# Patient Record
Sex: FEMALE | Race: WHITE | NOT HISPANIC OR LATINO | Employment: FULL TIME | URBAN - METROPOLITAN AREA
[De-identification: names, ages, dates, MRNs, and addresses within clinical notes are randomized per-mention and may not be internally consistent; named-entity substitution may affect disease eponyms.]

---

## 2017-06-15 ENCOUNTER — HOSPITAL ENCOUNTER (OUTPATIENT)
Facility: MEDICAL CENTER | Age: 37
End: 2017-06-15
Attending: PREVENTIVE MEDICINE
Payer: COMMERCIAL

## 2017-06-15 ENCOUNTER — OCCUPATIONAL MEDICINE (OUTPATIENT)
Dept: OCCUPATIONAL MEDICINE | Facility: CLINIC | Age: 37
End: 2017-06-15
Payer: COMMERCIAL

## 2017-06-15 VITALS
OXYGEN SATURATION: 97 % | DIASTOLIC BLOOD PRESSURE: 72 MMHG | SYSTOLIC BLOOD PRESSURE: 116 MMHG | RESPIRATION RATE: 12 BRPM | HEART RATE: 68 BPM | BODY MASS INDEX: 20.09 KG/M2 | WEIGHT: 128 LBS | TEMPERATURE: 98.6 F | HEIGHT: 67 IN
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
## 2017-06-15 LAB
ALBUMIN SERPL BCP-MCNC: 4.6 G/DL (ref 3.2–4.9)
ALBUMIN/GLOB SERPL: 1.5 G/DL
ALP SERPL-CCNC: 49 U/L (ref 30–99)
ALT SERPL-CCNC: 11 U/L (ref 2–50)
ANION GAP SERPL CALC-SCNC: 8 MMOL/L (ref 0–11.9)
AST SERPL-CCNC: 16 U/L (ref 12–45)
BILIRUB SERPL-MCNC: 0.5 MG/DL (ref 0.1–1.5)
BREATH ALCOHOL COMMENT: NORMAL
BUN SERPL-MCNC: 18 MG/DL (ref 8–22)
CALCIUM SERPL-MCNC: 9.6 MG/DL (ref 8.5–10.5)
CHLORIDE SERPL-SCNC: 106 MMOL/L (ref 96–112)
CO2 SERPL-SCNC: 25 MMOL/L (ref 20–33)
CREAT SERPL-MCNC: 0.71 MG/DL (ref 0.5–1.4)
ERYTHROCYTE [DISTWIDTH] IN BLOOD BY AUTOMATED COUNT: 44.5 FL (ref 35.9–50)
GFR SERPL CREATININE-BSD FRML MDRD: >60 ML/MIN/1.73 M 2
GLOBULIN SER CALC-MCNC: 3.1 G/DL (ref 1.9–3.5)
GLUCOSE SERPL-MCNC: 89 MG/DL (ref 65–99)
HBV CORE AB SERPL QL IA: NEGATIVE
HBV SURFACE AB SERPL IA-ACNC: >1000 MIU/ML (ref 0–10)
HBV SURFACE AG SER QL: NEGATIVE
HCT VFR BLD AUTO: 43.4 % (ref 37–47)
HCV AB SER QL: NEGATIVE
HGB BLD-MCNC: 14.2 G/DL (ref 12–16)
HIV 1+2 AB+HIV1 P24 AG SERPL QL IA: NON REACTIVE
MCH RBC QN AUTO: 30.7 PG (ref 27–33)
MCHC RBC AUTO-ENTMCNC: 32.7 G/DL (ref 33.6–35)
MCV RBC AUTO: 93.9 FL (ref 81.4–97.8)
PLATELET # BLD AUTO: 314 K/UL (ref 164–446)
PMV BLD AUTO: 9.9 FL (ref 9–12.9)
POC BREATHALIZER: 0 PERCENT (ref 0–0.01)
POTASSIUM SERPL-SCNC: 3.8 MMOL/L (ref 3.6–5.5)
PROT SERPL-MCNC: 7.7 G/DL (ref 6–8.2)
RBC # BLD AUTO: 4.62 M/UL (ref 4.2–5.4)
SODIUM SERPL-SCNC: 139 MMOL/L (ref 135–145)
WBC # BLD AUTO: 7.1 K/UL (ref 4.8–10.8)

## 2017-06-15 PROCEDURE — 82075 ASSAY OF BREATH ETHANOL: CPT | Performed by: PREVENTIVE MEDICINE

## 2017-06-15 PROCEDURE — 87340 HEPATITIS B SURFACE AG IA: CPT

## 2017-06-15 PROCEDURE — 86706 HEP B SURFACE ANTIBODY: CPT

## 2017-06-15 PROCEDURE — 87389 HIV-1 AG W/HIV-1&-2 AB AG IA: CPT

## 2017-06-15 PROCEDURE — 86803 HEPATITIS C AB TEST: CPT

## 2017-06-15 PROCEDURE — 99203 OFFICE O/P NEW LOW 30 MIN: CPT | Performed by: PREVENTIVE MEDICINE

## 2017-06-15 PROCEDURE — 86704 HEP B CORE ANTIBODY TOTAL: CPT

## 2017-06-15 PROCEDURE — 80053 COMPREHEN METABOLIC PANEL: CPT

## 2017-06-15 PROCEDURE — 85027 COMPLETE CBC AUTOMATED: CPT

## 2017-06-15 PROCEDURE — 8898 PR URINE 11 PANEL - SEND TO LAB: Performed by: PREVENTIVE MEDICINE

## 2017-06-15 NOTE — MR AVS SNAPSHOT
"Nellie Duarte   6/15/2017 11:20 AM   Occupational Medicine   MRN: [id number]    Department:  [facility]   Dept Phone:  [phone]    Description:  Female : 1980   Provider:  Guru Rosado D.O.           Reason for Visit     Follow-Up WC DOI 06/15/17- Needlestick       Allergies as of 6/15/2017     Allergen Noted Reactions    Nkda [No Known Drug Allergy] 10/22/2010         You were diagnosed with     Needlestick injury accident with exposure to body fluid   [[id number]]         Vital Signs     Blood Pressure Pulse Temperature Respirations Height Weight    116/72 mmHg 68 37 °C (98.6 °F) 12 1.702 m (5' 7\") 58.06 kg (128 lb)    Body Mass Index Oxygen Saturation Smoking Status             20.04 kg/m2 97% Never Smoker          Basic Information     Date Of Birth Sex Race Ethnicity Preferred Language    1980 Female White Non- English      Your appointments     2017 11:00 AM   Workers Compensation with Guru Rosado D.O.   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   Unknown - Present    Preventative health care Z00.00   2014 - Present    Asthma, mild intermittent J45.20   3/2/2015 - Present    Dysuria R30.0   3/2/2015 - Present      Health Maintenance        Date Due Completion Dates    IMM PNEUMOCOCCAL 19-64 (ADULT) MEDIUM RISK SERIES (1 of 1 - PPSV23) 2000 ---    PAP SMEAR 2017 (Done)    Override on 2014: Done    IMM DTaP/Tdap/Td Vaccine (2 - Td) 2024            Results     POCT Breath Alcohol Test      Component Value Standard Range & Units    POC Breathalizer 0.000 0.00 - 0.01 Percent    Breath Alcohol Comment                          Current Immunizations     Influenza TIV (IM) 10/2/2014, 2013, 3/13/2013    Tdap " Vaccine 4/21/2014    Tuberculin Skin Test 12/20/2013  4:57 PM, 8/16/2013, 8/17/2012  1:35 PM, 8/19/2011      Below and/or attached are the medications your provider expects you to take. Review all of your home medications and newly ordered medications with your provider and/or pharmacist. Follow medication instructions as directed by your provider and/or pharmacist. Please keep your medication list with you and share with your provider. Update the information when medications are discontinued, doses are changed, or new medications (including over-the-counter products) are added; and carry medication information at all times in the event of emergency situations     Allergies:  NKDA - (reactions not documented)               Medications  Valid as of: Mariella 15, 2017 - 12:58 PM    Generic Name Brand Name Tablet Size Instructions for use    Beclomethasone Dipropionate (Aero Soln) QVAR 40 MCG/ACT INHALE 1 PUFF BY MOUTH TWICE DAILY        Levalbuterol Tartrate (Aerosol) XOPENEX HFA 45 MCG/ACT Inhale 1-2 Puffs by mouth every 8 hours as needed for Shortness of Breath.        Norgestim-Eth Estrad Triphasic   Take  by mouth.        .                 Medicines prescribed today were sent to:     [facility] [address] [place] [address] - [address] [facility]    [address] [address]    Phone: [phone] Fax: [phone]    Open 24 Hours?: Yes    [id number] - [place], [place] - [address]    [address] [facility] [address]    Phone: [phone] Fax: [phone]    Open 24 Hours?: No      Medication refill instructions:       If your prescription bottle indicates you have medication refills left, it is not necessary to call your provider’s office. Please contact your pharmacy and they will refill your medication.    If your prescription bottle indicates you do not have any refills left, you may request refills at any time through one of the following ways: The online  [employer] system (except Urgent Care), by calling your provider’s office, or by asking your pharmacy to contact your provider’s office with a refill request. Medication refills are processed only during regular business hours and may not be available until the next business day. Your provider may request additional information or to have a follow-up visit with you prior to refilling your medication.   *Please Note: Medication refills are assigned a new Rx number when refilled electronically. Your pharmacy may indicate that no refills were authorized even though a new prescription for the same medication is available at the pharmacy. Please request the medicine by name with the pharmacy before contacting your provider for a refill.        Your To Do List     Future Labs/Procedures Complete By Expires    EXPOSED PERSON-SOURCE PT POSITIVE OR UNK (BLOOD & BODY FLUID EXPOSURE)  As directed 6/15/2018         [employer]ate Access Code: Activation code not generated  Current Sk[employer]Slate Status: Active

## 2017-06-15 NOTE — Clinical Note
[facility]   [address] [place] [address],   [address] [place] [address]  Phone: [phone] - Fax: [phone]        Occupational Health [facility] Progress Report and Disability Certification  Date of Service: 6/15/2017   No Show:  No  Date / Time of Next Visit: 6/16/2017@10:00am okay to double book   Claim Information   Patient Name: Nellie Duarte  Claim Number:     Employer: [employer]  Date of Injury: 6/15/2017     Insurer / TPA: [employer]  ID / SSN:     Occupation: RN  Diagnosis: The encounter diagnosis was Needlestick injury accident with exposure to body fluid.    Medical Information   Related to Industrial Injury? Yes    Subjective Complaints:  DOI 6/15/2017: After giving Lovenox injection injured worker accidentally stuck herself in the left hand with the needle. Injured worker vigorously washed wound with soap and water. Noted only minimal bleeding and sharp pain. Source status unknown, but blood work drawn. Source denied any history of HIV, or hepatitis C Patient currently asymptomatic.   Objective Findings: Constitutional: She appears well-developed and well-nourished.   HENT: Normocephalic and atraumatic. EOM are normal. No scleral icterus.   Cardiovascular: Normal rate, regular rhythm and normal heart sounds.    Pulmonary/Chest: Effort normal and breath sounds normal. No respiratory distress.   Abdominal: Bowel sounds are normal. There is no tenderness.   Neurological: She is alert and oriented to person, place, and time.   Skin: Skin is warm and dry.   Psychiatric: She has a normal mood and affect. Her behavior is normal.      Pre-Existing Condition(s):     Assessment:   Initial Visit    Status: Additional Care Required  Permanent Disability:No    Plan:      Diagnostics:      Comments:  Baseline labs drawn  Awaiting labs from source patient, patient to call with source labs turn positive  Discussed risks and benefits of HIV prophylaxis, given unknown  source HIV prophylaxis is not recommended, patient amenable to this  Follow-up anand soares    Disability Information   Status: Released to Full Duty    From:  6/15/2017  Through: 6/16/2017 Restrictions are:     Physical Restrictions   Sitting:    Standing:    Stooping:    Bending:      Squatting:    Walking:    Climbing:    Pushing:      Pulling:    Other:    Reaching Above Shoulder (L):   Reaching Above Shoulder (R):       Reaching Below Shoulder (L):    Reaching Below Shoulder (R):      Not to exceed Weight Limits   Carrying(hrs):   Weight Limit(lb):   Lifting(hrs):   Weight  Limit(lb):     Comments:      Repetitive Actions   Hands: i.e. Fine Manipulations from Grasping:     Feet: i.e. Operating Foot Controls:     Driving / Operate Machinery:     Physician Name: Guru Rosado D.O. Physician Signature: ScottyTAGURU GORDON D.O. e-Signature: Dr. Baldo Villarreal, Medical Director   Clinic Name / Location: [address],   [address] [place] [address] Clinic Phone Number: Dept: [phone]   Appointment Time: 11:20 Am Visit Start Time: 12:17 PM   Check-In Time:  11:59 Am Visit Discharge Time:  @1:00pm    Original-Treating Physician or Chiropractor    Page 2-Insurer/TPA    Page 3-Employer    Page 4-Employee

## 2017-06-15 NOTE — PROGRESS NOTES
Subjective:      Nelile Duarte is a 36 y.o. female who presents with No chief complaint on file.      DOI 6/15/2017: After giving Lovenox injection injured worker accidentally stuck herself in the left hand with the needle. Injured worker vigorously washed wound with soap and water. Noted only minimal bleeding and sharp pain. Source status unknown, but blood work drawn. Source denied any history of HIV, or hepatitis C Patient currently asymptomatic.     HPI    ROS  ROS: All systems were reviewed on intake form, form was reviewed and signed. See scanned documents in media. Pertinent positives and negatives included in HPI.    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     Physical Exam    Constitutional: She appears well-developed and well-nourished.   HENT: Normocephalic and atraumatic. EOM are normal. No scleral icterus.   Cardiovascular: Normal rate, regular rhythm and normal heart sounds.    Pulmonary/Chest: Effort normal and breath sounds normal. No respiratory distress.   Abdominal: Bowel sounds are normal. There is no tenderness.   Neurological: She is alert and oriented to person, place, and time.   Skin: Skin is warm and dry.   Psychiatric: She has a normal mood and affect. Her behavior is normal.          Assessment/Plan:     1. Needlestick injury accident with exposure to body fluid  - EXPOSED PERSON-SOURCE PT POSITIVE OR UNK (BLOOD & BODY FLUID EXPOSURE); Future  Baseline labs drawn  Awaiting labs from source patient, patient to call with source labs turn positive  Discussed risks and benefits of HIV prophylaxis, given unknown source HIV prophylaxis is not recommended, patient amenable to this  Follow-up tomorrow

## 2017-06-15 NOTE — Clinical Note
EMPLOYEE’S CLAIM FOR COMPENSATION/ REPORT OF INITIAL TREATMENT  FORM C-4    EMPLOYEE’S CLAIM - PROVIDE ALL INFORMATION REQUESTED   First Name  Nellie Last Name  Gerald Birthdate                    1980                Sex  female Claim Number   Home Address  [address] Age  36 y.o. Height   Weight   SSN     [facility] Zip  [address] Telephone  [phone] (home)    Mailing Address  [address] [facility] Zip  [address] Primary Language Spoken  English    Insurer   Third Party   [employer]   Employee's Occupation (Job Title) When Injury or Occupational Disease Occurred  RN    Employer's Name  [employer]  Telephone  [phone]    Employer Address  [address] \ [facility] [place] [facility]  Zip   [address]   Date of Injury  6/15/2017               Hour of Injury  9:40 AM Date Employer Notified  6/15/2017 Last Day of Work after Injury or Occupational Disease  6/15/2017 Supervisor to Whom Injury Reported  Kristy N   Address or Location of Accident (if applicable)  [facility]   What were you doing at the time of accident? (if applicable)  Giving medication    How did this injury or occupational disease occur? (Be specific an answer in detail. Use additional sheet if necessary)  Injecting patient with lovenox syringe. Upon injection into patient's skin, my right arm knocked patients off tray, I accidentally jerked my arm as reaction, needle came out and injected into my left hand   If you believe that you have an occupational disease, when did you first have knowledge of the disability and it relationship to your employment?  N/A Witnesses to the Accident  N/A      Nature of Injury or Occupational Disease  Puncture  Part(s) of Body Injured or Affected  Hand (L), N/A, N/A    I certify that the above is true and correct to the best of my knowledge and that I have  provided this information in order to obtain the benefits of [place]  I hereby authorize any physician, chiropractor, surgeon, practitioner, or other person, any hospital, including [facility] hospital, any medical service organization, any insurance company, or other institution or organization to release to each other, any medical or other information, including benefits paid or payable, pertinent to this injury or disease, except information relative to diagnosis, treatment and/or counseling for AIDS, psychological conditions, alcohol or controlled substances, for which I must give specific authorization.  A Photostat of this authorization shall be as valid as the original.     Date   Place   Employee’s Signature   THIS REPORT MUST BE COMPLETED AND MAILED WITHIN 3 WORKING DAYS OF TREATMENT   Place  [facility]  Name of [facility]   Date  6/15/2017 Diagnosis  (Z77.21,  W27.3XXA) Needlestick injury accident with exposure to body fluid Is there evidence the injured employee was under the influence of alcohol and/or another controlled substance at the time of accident?   Hour  12:17 PM Description of Injury or Disease  The encounter diagnosis was Needlestick injury accident with exposure to body fluid. No   Treatment  None  Have you advised the patient to remain off work five days or more? No   X-Ray Findings      If Yes   From Date  To Date      From information given by the employee, together with medical evidence, can you directly connect this injury or occupational disease as job incurred?  Yes If No Full Duty  Yes Modified Duty      Is additional medical care by a physician indicated?  Yes If Modified Duty, Specify any Limitations / Restrictions      Do you know of any previous injury or disease contributing to this condition or occupational disease?    "                         No   Date  6/15/2017 Print Doctor’s Name Guru Rosado D.O. I certify the employer’s copy of  this form was mailed on:   Address  [address],   [address] Insurer’s Use Only     [facility] Zip  [address]    Provider’s Tax ID Number  [id number]  Telephone  Dept: [phone]        e-SignTAYLORGURU D.O.   e-Signature: Dr. Baldo Villarreal, Medical Director Degree  DO        ORIGINAL-TREATING PHYSICIAN OR CHIROPRACTOR    PAGE 2-INSURER/TPA    PAGE 3-EMPLOYER    PAGE 4-EMPLOYEE             Form C-4 (rev10/07)              BRIEF DESCRIPTION OF RIGHTS AND BENEFITS  (Pursuant to [place] 616C.[place])    Notice of Injury or Occupational Disease (Incident Report Form C-1): If an injury or occupational disease (OD) arises out of and in the  course of employment, you must provide written notice to your employer as soon as practicable, but no later than 7 days after the accident or  OD. Your employer shall maintain a sufficient supply of the required forms.    Claim for Compensation (Form C-4): If medical treatment is sought, the form C-4 is available at the place of initial treatment. A completed  \"Claim for Compensation\" (Form C-4) must be filed within 90 days after an accident or OD. The treating physician or chiropractor must,  within 3 working days after treatment, complete and mail to the employer, the employer's insurer and third-party , the Claim for  Compensation.    Medical Treatment: If you require medical treatment for your on-the-job injury or OD, you may be required to select a physician or  chiropractor from a list provided by your workers’ compensation insurer, if it has contracted with an Organization for Managed Care (MCO) or  Preferred Provider Organization (PPO) or providers of health care. If your employer has not entered into a contract with an MCO or PPO, you  may select a physician or chiropractor from the Panel of Physicians and Chiropractors. Any medical " costs related to your industrial injury or  OD will be paid by your insurer.    Temporary Total Disability (TTD): If your doctor has certified that you are unable to work for a period of at least 5 consecutive days, or 5  cumulative days in a 20-day period, or places restrictions on you that your employer does not accommodate, you may be entitled to TTD  compensation.    Temporary Partial Disability (TPD): If the wage you receive upon reemployment is less than the compensation for TTD to which you are  entitled, the insurer may be required to pay you TPD compensation to make up the difference. TPD can only be paid for a maximum of 24  months.    Permanent Partial Disability (PPD): When your medical condition is stable and there is an indication of a PPD as a result of your injury or  OD, within 30 days, your insurer must arrange for an evaluation by a rating physician or chiropractor to determine the degree of your PPD. The  amount of your PPD award depends on the date of injury, the results of the PPD evaluation and your age and wage.    Permanent Total Disability (PTD): If you are medically certified by a treating physician or chiropractor as permanently and totally disabled  and have been granted a PTD status by your insurer, you are entitled to receive monthly benefits not to exceed 66 2/3% of your average  monthly wage. The amount of your PTD payments is subject to reduction if you previously received a PPD award.    Vocational Rehabilitation Services: You may be eligible for vocational rehabilitation services if you are unable to return to the job due to a  permanent physical impairment or permanent restrictions as a result of your injury or occupational disease.    Transportation and Per Evgeny Reimbursement: You may be eligible for travel expenses and per evgeny associated with medical treatment.    Reopening: You may be able to reopen your claim if your condition worsens after claim closure.    Appeal Process:  If you disagree with a written determination issued by the insurer or the insurer does not respond to your request, you may  appeal to the Department of Administration, , by following the instructions contained in your determination letter. You must  appeal the determination within 70 days from the date of the determination letter at [address], [address], [place], [place]  [address], or [address], [address], [place], [place] [address]. If you disagree with the  decision, you may appeal to the  Department of Administration, . You must file your appeal within 30 days from the date of the  decision  letter at [address], [address], [place], [place] [address], or [address] [facility] [address], [place], [place] [address]. If you  disagree with a decision of an , you may file a petition for judicial review with the District Court. You must do so within 30  days of the Appeal Officer’s decision. You may be represented by an  at your own expense or you may contact the [facility] for possible  representation.    [place]  for Injured Workers (NAIW): If you disagree with a  decision, you may request that NAIW represent you  without charge at an  Hearing. For information regarding denial of benefits, you may contact the [facility] at: [address], [address], [place] [address], [phone], or [address], [address], [place] [address], [phone]    To File a Complaint with the Division: If you wish to file a complaint with the  of the Division of Industrial Relations (DIR),  please contact the Workers’ Compensation Section, [address], [address], [place], [place] [address], telephone [phone]) [phone], or  [address], [place] [address], telephone [phone] [phone].    For assistance with Workers’  Compensation Issues: you may contact the Office of the Governor Consumer Health Assistance, [facility] [facility] [address], [place] [address], Toll Free 1-997.290.4744, Web site: http://govcha.[facility].nv., E-mail  [email or web address].[email or web address].                                                                                                                                                                                                                                   __________________________________________________________________                                                                   _________________                Employee Name / Signature                                                                                                                                                       Date                                                                                                                                                                                                     D-2 (rev. 10/07)

## 2017-06-20 ENCOUNTER — OCCUPATIONAL MEDICINE (OUTPATIENT)
Dept: OCCUPATIONAL MEDICINE | Facility: CLINIC | Age: 37
End: 2017-06-20
Payer: COMMERCIAL

## 2017-06-20 VITALS
TEMPERATURE: 98.2 F | SYSTOLIC BLOOD PRESSURE: 120 MMHG | HEART RATE: 81 BPM | BODY MASS INDEX: 20.4 KG/M2 | HEIGHT: 67 IN | DIASTOLIC BLOOD PRESSURE: 90 MMHG | OXYGEN SATURATION: 96 % | WEIGHT: 130 LBS
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
## 2017-06-20 PROCEDURE — 99212 OFFICE O/P EST SF 10 MIN: CPT | Performed by: PREVENTIVE MEDICINE

## 2017-06-20 ASSESSMENT — PAIN SCALES - GENERAL: PAINLEVEL: NO PAIN

## 2017-06-20 NOTE — PROGRESS NOTES
"Subjective:      Nellie Duarte is a 37 y.o. female who presents with Follow-Up      DOI 6/15/2017: After giving Lovenox injection injured worker accidentally stuck herself in the left hand with the needle. Injured worker vigorously washed wound with soap and water. Noted only minimal bleeding and sharp pain. Patient asymptomatic. Source negative.     HPI    ROS       Objective:     /90 mmHg  Pulse 81  Temp(Src) 36.8 °C (98.2 °F)  Ht 1.702 m (5' 7\")  Wt 58.968 kg (130 lb)  BMI 20.36 kg/m2  SpO2 96%     Physical Exam    Constitutional: She appears well-developed and well-nourished.      Cardiovascular: Normal rate.   Pulmonary/Chest: Effort normal. No respiratory distress.    Neurological: She is alert and oriented to person, place, and time.    Skin: Skin is warm and dry.    Psychiatric: She has a normal mood and affect. Her behavior is normal       Assessment/Plan:     1. Needlestick injury accident with exposure to body fluid  Baseline labs were normal. Immune to Hep B. Negative for Hep C, Hep B and HIV  Given negative source no further follow-up or monitoring recommended patient amenable to this  Full duty  Release from care          "

## 2017-06-20 NOTE — MR AVS SNAPSHOT
"Nellie Duarte   2017 3:30 PM   Occupational Medicine   MRN: [id number]    Department:  [facility]   Dept Phone:  [phone]    Description:  Female : 1980   Provider:  Guru Rosado D.O.           Reason for Visit     Follow-Up WC DOI 06/15/17 [place] ROOM 1      Allergies as of 2017     Allergen Noted Reactions    Nkda [No Known Drug Allergy] 10/22/2010         You were diagnosed with     Needlestick injury accident with exposure to body fluid   [[id number]]         Vital Signs     Blood Pressure Pulse Temperature Height Weight Body Mass Index    120/90 mmHg 81 36.8 °C (98.2 °F) 1.702 m (5' 7\") 58.968 kg (130 lb) 20.36 kg/m2    Oxygen Saturation Smoking Status                96% Never Smoker           Basic Information     Date Of Birth Sex Race Ethnicity Preferred Language    1980 Female White Non- English      Problem List              ICD-10-CM Priority Class Noted - Resolved    Allergic rhinitis    2009 - Present    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   Unknown - Present    Preventative health care Z00.00   2014 - Present    Asthma, mild intermittent J45.20   3/2/2015 - Present    Dysuria R30.0   3/2/2015 - Present      Health Maintenance        Date Due Completion Dates    IMM PNEUMOCOCCAL 19-64 (ADULT) MEDIUM RISK SERIES (1 of 1 - PPSV23) 2000 ---    PAP SMEAR 2017 (Done)    Override on 2014: Done    IMM DTaP/Tdap/Td Vaccine (2 - Td) 2024            Current Immunizations     Influenza TIV (IM) 10/2/2014, 2013, 3/13/2013    Tdap Vaccine 2014    Tuberculin Skin Test 2013  4:57 PM, 2013, 2012  1:35 PM, 2011      Below and/or attached are the medications your provider expects you to take. Review all of your home medications and newly ordered medications with your provider and/or pharmacist. Follow medication instructions as directed by your provider and/or pharmacist. Please keep your medication " list with you and share with your provider. Update the information when medications are discontinued, doses are changed, or new medications (including over-the-counter products) are added; and carry medication information at all times in the event of emergency situations     Allergies:  NKDA - (reactions not documented)               Medications  Valid as of: June 20, 2017 -  4:08 PM    Generic Name Brand Name Tablet Size Instructions for use    Beclomethasone Dipropionate (Aero Soln) QVAR 40 MCG/ACT INHALE 1 PUFF BY MOUTH TWICE DAILY        Levalbuterol Tartrate (Aerosol) XOPENEX HFA 45 MCG/ACT Inhale 1-2 Puffs by mouth every 8 hours as needed for Shortness of Breath.        Norgestim-Eth Estrad Triphasic   Take  by mouth.        .                 Medicines prescribed today were sent to:     [facility] [id number] [place] [address] - [address] [facility] [address] [place]    [address] [address]    Phone: [phone] Fax: [phone]    Open 24 Hours?: Yes    [facility] #[id number] - [place], [place] - [address]    [address] [facility] [address]    Phone: [phone] Fax: [phone]    Open 24 Hours?: No      Medication refill instructions:       If your prescription bottle indicates you have medication refills left, it is not necessary to call your provider’s office. Please contact your pharmacy and they will refill your medication.    If your prescription bottle indicates you do not have any refills left, you may request refills at any time through one of the following ways: The online [employer] system (except Urgent Care), by calling your provider’s office, or by asking your pharmacy to contact your provider’s office with a refill request. Medication refills are processed only during regular business hours and may not be available until the next business day. Your provider may request additional information or to have a follow-up visit with you prior to refilling your  medication.   *Please Note: Medication refills are assigned a new Rx number when refilled electronically. Your pharmacy may indicate that no refills were authorized even though a new prescription for the same medication is available at the pharmacy. Please request the medicine by name with the pharmacy before contacting your provider for a refill.           [employer] Access Code: Activation code not generated  Current [employer] Status: Active

## 2017-06-20 NOTE — Clinical Note
[address],   [address] [place] [address]  Phone: [phone] - Fax: [phone]        [facility] Progress Report and Disability Certification  Date of Service: 6/20/2017   No Show:  No  Date / Time of Next Visit:  Release from care   Claim Information   Patient Name: Nellie Duarte  Claim Number:     Employer: [employer]  Date of Injury: 6/15/2017     Insurer / TPA: [employer]  ID / SSN:     Occupation: RN Diagnosis: The encounter diagnosis was Needlestick injury accident with exposure to body fluid.    Medical Information   Related to Industrial Injury? 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 Injured worker vigorously washed wound with soap and water. Noted only minimal bleeding and sharp pain. Patient asymptomatic. Source negative.   Objective Findings: Constitutional: She appears well-developed and well-nourished.      Cardiovascular: Normal rate.   Pulmonary/Chest: Effort normal. No respiratory distress.    Neurological: She is alert and oriented to person, place, and time.    Skin: Skin is warm and dry.    Psychiatric: She has a normal mood and affect. Her behavior is normal   Pre-Existing Condition(s):     Assessment:   Condition Improved    Status: Discharged /  MMI  Permanent Disability:No    Plan:      Diagnostics:      Comments:  Baseline labs were normal. Immune to Hep B. Negative for Hep C, Hep B and HIV  Given negative source no further follow-up or monitoring recommended patient amenable to this  Full duty  Release from care    Disability Information   Status: Released to Full Duty    From:  6/20/2017  Through:   Restrictions are:     Physical Restrictions   Sitting:    Standing:    Stooping:    Bending:      Squatting:    Walking:    Climbing:    Pushing:      Pulling:    Other:    Reaching Above  Shoulder (L):   Reaching Above Shoulder (R):       Reaching Below Shoulder (L):    Reaching Below Shoulder (R):      Not to exceed Weight Limits   Carrying(hrs):   Weight Limit(lb):   Lifting(hrs):   Weight  Limit(lb):     Comments:      Repetitive Actions   Hands: i.e. Fine Manipulations from Grasping:     Feet: i.e. Operating Foot Controls:     Driving / Operate Machinery:     Physician Name: Guru Barakat D.O. Physician Signature: cheoSignGURU BARAKAT D.O. e-Signature: Dr. Baldo Villarreal, Medical Director   Clinic Name / Location: [address],   [address]  [place] [address] Clinic Phone Number: Dept: [phone]   Appointment Time: 3:30 Pm Visit Start Time: 3:32 PM   Check-In Time:  3:18 Pm Visit Discharge Time:  @3:42PM   Original-Treating Physician or Chiropractor    Page 2-Insurer/TPA    Page 3-Employer    Page 4-Employee

## 2017-08-01 ENCOUNTER — OFFICE VISIT (OUTPATIENT)
Dept: MEDICAL GROUP | Facility: PHYSICIAN GROUP | Age: 37
End: 2017-08-01
Payer: COMMERCIAL

## 2017-08-01 VITALS
BODY MASS INDEX: 21.66 KG/M2 | WEIGHT: 138 LBS | HEART RATE: 80 BPM | RESPIRATION RATE: 16 BRPM | HEIGHT: 67 IN | DIASTOLIC BLOOD PRESSURE: 72 MMHG | OXYGEN SATURATION: 98 % | SYSTOLIC BLOOD PRESSURE: 122 MMHG | TEMPERATURE: 98.8 F

## 2017-08-01 DIAGNOSIS — J45.20 MILD INTERMITTENT ASTHMA WITHOUT COMPLICATION: ICD-10-CM

## 2017-08-01 PROCEDURE — 99214 OFFICE O/P EST MOD 30 MIN: CPT | Performed by: NURSE PRACTITIONER

## 2017-08-01 RX ORDER — ACETAMINOPHEN AND CODEINE PHOSPHATE 120; 12 MG/5ML; MG/5ML
0.35 SOLUTION ORAL DAILY
COMMUNITY
Start: 2017-07-28 | End: 2018-07-28

## 2017-08-01 RX ORDER — LEVALBUTEROL TARTRATE 45 UG/1
1-2 AEROSOL, METERED ORAL EVERY 8 HOURS PRN
Qty: 1 INHALER | Refills: 3 | Status: SHIPPED | OUTPATIENT
Start: 2017-08-01

## 2017-08-01 ASSESSMENT — PAIN SCALES - GENERAL: PAINLEVEL: NO PAIN

## 2017-08-01 ASSESSMENT — PATIENT HEALTH QUESTIONNAIRE - PHQ9: CLINICAL INTERPRETATION OF PHQ2 SCORE: 0

## 2017-08-01 NOTE — MR AVS SNAPSHOT
"        Nellie Duarte   2017 4:00 PM   Office Visit   MRN: [id number]    Department:  [facility]   Dept Phone:  [phone]    Description:  Female : 1980   Provider:  RUFINA Tobin           Reason for Visit     Establish Care New Patient     Medication Refill           Allergies as of 2017     Allergen Noted Reactions    Nkda [No Known Drug Allergy] 10/22/2010         You were diagnosed with     Mild intermittent asthma without complication   [[id number]]         Vital Signs     Blood Pressure Pulse Temperature Respirations Height Weight    122/72 mmHg 80 37.1 °C (98.8 °F) 16 1.702 m (5' 7.01\") 62.596 kg (138 lb)    Body Mass Index Oxygen Saturation Last Menstrual Period Breastfeeding? 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   Unknown - Present    Asthma, mild intermittent J45.20   3/2/2015 - Present      Health Maintenance        Date Due Completion Dates    IMM PNEUMOCOCCAL 19-64 (ADULT) MEDIUM RISK SERIES (1 of 1 - PPSV23) 2000 ---    PAP SMEAR 2017 (Done)    Override on 2014: Done    IMM INFLUENZA (1) 2017 10/2/2014, 2013, 3/13/2013    IMM DTaP/Tdap/Td Vaccine (2 - Td) 2024            Current Immunizations     Influenza TIV (IM) 10/2/2014, 2013, 3/13/2013    Tdap Vaccine 2014    Tuberculin Skin Test 2013  4:57 PM, 2013, 2012  1:35 PM, 2011      Below and/or attached are the medications your provider expects you to take. Review all of your home medications and newly ordered medications with your provider and/or pharmacist. Follow medication instructions as directed by your provider and/or pharmacist. " Please keep your medication list with you and share with your provider. Update the information when medications are discontinued, doses are changed, or new medications (including over-the-counter products) are added; and carry medication information at all times in the event of emergency situations     Allergies:  NKDA - (reactions not documented)               Medications  Valid as of: August 01, 2017 -  4:06 PM    Generic Name Brand Name Tablet Size Instructions for use    Beclomethasone Dipropionate (Aero Soln) QVAR 40 MCG/ACT INHALE 1 PUFF BY MOUTH TWICE DAILY        Levalbuterol Tartrate (Aerosol) XOPENEX HFA 45 MCG/ACT Inhale 1-2 Puffs by mouth every 8 hours as needed for Shortness of Breath.        Norethindrone (Tab) MICRONOR 0.35 MG Take 0.35 mg by mouth every day.        .                 Medicines prescribed today were sent to:     [facility] #[id number] - [place] - [address] [place] [address]    Phone: [phone] Fax: [phone]    Open 24 Hours?: No      Medication refill instructions:       If your prescription bottle indicates you have medication refills left, it is not necessary to call your provider’s office. Please contact your pharmacy and they will refill your medication.    If your prescription bottle indicates you do not have any refills left, you may request refills at any time through one of the following ways: The online [employer] system (except Urgent Care), by calling your provider’s office, or by asking your pharmacy to contact your provider’s office with a refill request. Medication refills are processed only during regular business hours and may not be available until the next business day. Your provider may request additional information or to have a follow-up visit with you prior to refilling your medication.   *Please Note: Medication refills are assigned a new Rx number when refilled electronically. Your pharmacy may indicate that no refills were authorized even though  a new prescription for the same medication is available at the pharmacy. Please request the medicine by name with the pharmacy before contacting your provider for a refill.           [employer] Access Code: Activation code not generated  Current [employer] Status: Active

## 2017-08-02 NOTE — PROGRESS NOTES
Chief Complaint   Patient presents with   • Establish Care     New Patient    • Medication Refill       HISTORY OF PRESENT ILLNESS: Patient is a 37 y.o. female established patient who presents today to discuss asthma and establish care.    Asthma, mild intermittent  Chronic in nature. Stable. Well controlled. Patient has not had an exacerbation in several months. Patient continues to use Qvar daily, Xopenex as needed for exercise, wheezing. Patient does use Xopenex prior to exercise. Continues dizziness, shortness of breath, chest pain wheezing palpitations cough or nighttime wakening. Refill of Qvar and Xopenex are provided at this time.      Patient Active Problem List    Diagnosis Date Noted   • Asthma, mild intermittent 03/02/2015   • HPV in female    • Allergic rhinitis 07/24/2009       Allergies:Nkda    Current Outpatient Prescriptions   Medication Sig Dispense Refill   • norethindrone (MICRONOR) 0.35 MG tablet Take 0.35 mg by mouth every day.     • levalbuterol (XOPENEX HFA) 45 MCG/ACT inhaler Inhale 1-2 Puffs by mouth every 8 hours as needed for Shortness of Breath. 1 Inhaler 3   • beclomethasone (QVAR) 40 MCG/ACT inhaler INHALE 1 PUFF BY MOUTH TWICE DAILY 16 g 6     No current facility-administered medications for this visit.       Social History   Substance Use Topics   • Smoking status: Never Smoker    • Smokeless tobacco: Never Used   • Alcohol Use: 3.5 oz/week     7 Glasses of wine per week       Family Status   Relation Status Death Age   • Mother Alive    • Father Alive    • Sister Alive    • Maternal Grandmother Alive    • Maternal Grandfather Alive    • Paternal Grandmother Alive    • Paternal Grandfather Alive      Family History   Problem Relation Age of Onset   • Hypertension Maternal Grandmother    • Heart Disease Paternal Grandmother    • Heart Disease Paternal Grandfather        Review of Systems:   Constitutional:  Negative for fever, chills, weight loss and malaise/fatigue.   HEENT:   "Negative for ear pain, nosebleeds, congestion, sore throat and neck pain.    Eyes:  Negative for blurred vision.   Respiratory:  Negative for cough, sputum production, shortness of breath and wheezing.    Cardiovascular:  Negative for chest pain, palpitations, orthopnea and leg swelling.   Gastrointestinal:  Negative for heartburn, nausea, vomiting and abdominal pain.   Genitourinary:  Negative for dysuria, urgency and frequency.   Musculoskeletal:  Negative for myalgias, back pain and joint pain.   Skin:  Negative for rash and itching.   Neurological:  Negative for dizziness, tingling, tremors, sensory change, focal weakness and headaches.   Endo/Heme/Allergies:  Does not bruise/bleed easily.   Psychiatric/Behavioral:  Negative for depression, suicidal ideas and memory loss.  The patient is not nervous/anxious and does not have insomnia.    All other systems reviewed and are negative except as in HPI.    Exam:  Blood pressure 122/72, pulse 80, temperature 37.1 °C (98.8 °F), resp. rate 16, height 1.702 m (5' 7.01\"), weight 62.596 kg (138 lb), last menstrual period 07/29/2017, SpO2 98 %, currently breastfeeding.  General:  Normal appearing. No distress.  HEENT:  Normocephalic. Eyes conjunctiva clear lids without ptosis, pupils equal and reactive to light accommodation, ears normal shape and contour, canals are clear bilaterally, tympanic membranes are benign, nasal mucosa benign, oropharynx is without erythema, edema or exudates.   Neck:  Supple without JVD or bruit. Thyroid is not enlarged.  Pulmonary:  Clear to ausculation.  Normal effort. No rales, ronchi, or wheezing.  Cardiovascular:  Regular rate and rhythm without murmur. Carotid and radial pulses are intact and equal bilaterally.  Abdomen:  Soft, nontender, nondistended. Normal bowel sounds. Liver and spleen are not palpable  Neurologic:  Grossly nonfocal  Lymph:  No cervical, supraclavicular or axillary lymph nodes are palpable  Skin:  Warm and dry.  No " obvious lesions.  Musculoskeletal:  Normal gait. No extremity cyanosis, clubbing, or edema.  Psych:  Normal mood and affect. Alert and oriented x3. Judgment and insight is normal.      Medical decision-making and discussion: Nellie Duarte is a 37 her old female patient here today to establish care and discuss asthma refill for Xopenex and Qvar are provided at this time. Patient continues to take Micronor daily patient continues to breast-feed this is discussed with the patient with regard to Xopenex and Qvar. Patient is overall feeling well and does not have any questions or concerns at this time. Patient will follow-up in one year or as needed. Patient is encouraged to be seen in the emergency room for chest pain, palpitations, shortness of breath, dizziness, severe abdominal pain or other concerning symptoms.      Please note that this dictation was created using voice recognition software. I have made every reasonable attempt to correct obvious errors, but I expect that there are errors of grammar and possibly content that I did not discover before finalizing the note.    Assessment/Plan:  1. Mild intermittent asthma without complication  levalbuterol (XOPENEX HFA) 45 MCG/ACT inhaler    beclomethasone (QVAR) 40 MCG/ACT inhaler          I have placed the below orders and discussed them with an approved delegating provider. The MA is performing the below orders under the direction of Dr. Gauthier.

## 2018-03-07 DIAGNOSIS — J45.20 MILD INTERMITTENT ASTHMA WITHOUT COMPLICATION: ICD-10-CM
